# Patient Record
Sex: MALE | Race: WHITE | URBAN - METROPOLITAN AREA
[De-identification: names, ages, dates, MRNs, and addresses within clinical notes are randomized per-mention and may not be internally consistent; named-entity substitution may affect disease eponyms.]

---

## 2017-08-04 ENCOUNTER — OFFICE VISIT (OUTPATIENT)
Dept: FAMILY MEDICINE CLINIC | Facility: CLINIC | Age: 47
End: 2017-08-04

## 2017-08-04 ENCOUNTER — HOSPITAL ENCOUNTER (OUTPATIENT)
Dept: LAB | Age: 47
Discharge: HOME | End: 2017-08-04
Payer: COMMERCIAL

## 2017-08-04 VITALS
SYSTOLIC BLOOD PRESSURE: 122 MMHG | RESPIRATION RATE: 15 BRPM | TEMPERATURE: 97.8 F | OXYGEN SATURATION: 98 % | HEART RATE: 48 BPM | WEIGHT: 186.8 LBS | DIASTOLIC BLOOD PRESSURE: 76 MMHG | HEIGHT: 71 IN | BODY MASS INDEX: 26.15 KG/M2

## 2017-08-04 DIAGNOSIS — R79.89 ELEVATED TSH: ICD-10-CM

## 2017-08-04 DIAGNOSIS — Z13.31 NEGATIVE DEPRESSION SCREENING: ICD-10-CM

## 2017-08-04 DIAGNOSIS — Z00.00 ROUTINE GENERAL MEDICAL EXAMINATION AT A HEALTH CARE FACILITY: Primary | ICD-10-CM

## 2017-08-04 DIAGNOSIS — Z00.00 ROUTINE GENERAL MEDICAL EXAMINATION AT A HEALTH CARE FACILITY: ICD-10-CM

## 2017-08-04 DIAGNOSIS — J30.1 SEASONAL ALLERGIC RHINITIS DUE TO POLLEN: ICD-10-CM

## 2017-08-04 LAB
ALBUMIN SERPL BCP-MCNC: 4.6 G/DL (ref 3.4–5)
ALBUMIN/GLOB SERPL: 1.6 {RATIO} (ref 0.8–1.7)
ALP SERPL-CCNC: 55 U/L (ref 45–117)
ALT SERPL-CCNC: 27 U/L (ref 16–61)
ANION GAP BLD CALC-SCNC: 10 MMOL/L (ref 3–18)
APPEARANCE UR: CLEAR
AST SERPL W P-5'-P-CCNC: 30 U/L (ref 15–37)
BASOPHILS # BLD AUTO: 0 K/UL (ref 0–0.06)
BASOPHILS # BLD: 1 % (ref 0–2)
BILIRUB SERPL-MCNC: 1.2 MG/DL (ref 0.2–1)
BILIRUB UR QL: NEGATIVE
BUN SERPL-MCNC: 9 MG/DL (ref 7–18)
BUN/CREAT SERPL: 11 (ref 12–20)
CALCIUM SERPL-MCNC: 9.1 MG/DL (ref 8.5–10.1)
CHLORIDE SERPL-SCNC: 103 MMOL/L (ref 100–108)
CHOLEST SERPL-MCNC: 210 MG/DL
CO2 SERPL-SCNC: 27 MMOL/L (ref 21–32)
COLOR UR: YELLOW
CREAT SERPL-MCNC: 0.79 MG/DL (ref 0.6–1.3)
DIFFERENTIAL METHOD BLD: ABNORMAL
EOSINOPHIL # BLD: 0.1 K/UL (ref 0–0.4)
EOSINOPHIL NFR BLD: 2 % (ref 0–5)
ERYTHROCYTE [DISTWIDTH] IN BLOOD BY AUTOMATED COUNT: 13.4 % (ref 11.6–14.5)
GLOBULIN SER CALC-MCNC: 2.9 G/DL (ref 2–4)
GLUCOSE SERPL-MCNC: 75 MG/DL (ref 74–99)
GLUCOSE UR STRIP.AUTO-MCNC: NEGATIVE MG/DL
HCT VFR BLD AUTO: 43.2 % (ref 36–48)
HDLC SERPL-MCNC: 89 MG/DL (ref 40–60)
HDLC SERPL: 2.4 {RATIO} (ref 0–5)
HGB BLD-MCNC: 14.8 G/DL (ref 13–16)
HGB UR QL STRIP: NEGATIVE
KETONES UR QL STRIP.AUTO: 15 MG/DL
LDLC SERPL CALC-MCNC: 111.2 MG/DL (ref 0–100)
LEUKOCYTE ESTERASE UR QL STRIP.AUTO: NEGATIVE
LIPID PROFILE,FLP: ABNORMAL
LYMPHOCYTES # BLD AUTO: 40 % (ref 21–52)
LYMPHOCYTES # BLD: 1.7 K/UL (ref 0.9–3.6)
MCH RBC QN AUTO: 31.8 PG (ref 24–34)
MCHC RBC AUTO-ENTMCNC: 34.3 G/DL (ref 31–37)
MCV RBC AUTO: 92.9 FL (ref 74–97)
MONOCYTES # BLD: 0.3 K/UL (ref 0.05–1.2)
MONOCYTES NFR BLD AUTO: 6 % (ref 3–10)
NEUTS SEG # BLD: 2.3 K/UL (ref 1.8–8)
NEUTS SEG NFR BLD AUTO: 51 % (ref 40–73)
NITRITE UR QL STRIP.AUTO: NEGATIVE
PH UR STRIP: 5 [PH] (ref 5–8)
PLATELET # BLD AUTO: 204 K/UL (ref 135–420)
PMV BLD AUTO: 11.6 FL (ref 9.2–11.8)
POTASSIUM SERPL-SCNC: 4 MMOL/L (ref 3.5–5.5)
PROT SERPL-MCNC: 7.5 G/DL (ref 6.4–8.2)
PROT UR STRIP-MCNC: NEGATIVE MG/DL
RBC # BLD AUTO: 4.65 M/UL (ref 4.7–5.5)
SODIUM SERPL-SCNC: 140 MMOL/L (ref 136–145)
SP GR UR REFRACTOMETRY: 1.01 (ref 1–1.03)
T4 FREE SERPL-MCNC: 1 NG/DL (ref 0.7–1.5)
TRIGL SERPL-MCNC: 49 MG/DL (ref ?–150)
TSH SERPL DL<=0.05 MIU/L-ACNC: 18.4 UIU/ML (ref 0.36–3.74)
UROBILINOGEN UR QL STRIP.AUTO: 0.2 EU/DL (ref 0.2–1)
VLDLC SERPL CALC-MCNC: 9.8 MG/DL
WBC # BLD AUTO: 4.3 K/UL (ref 4.6–13.2)

## 2017-08-04 PROCEDURE — 84443 ASSAY THYROID STIM HORMONE: CPT | Performed by: FAMILY MEDICINE

## 2017-08-04 PROCEDURE — 81003 URINALYSIS AUTO W/O SCOPE: CPT | Performed by: FAMILY MEDICINE

## 2017-08-04 PROCEDURE — 80061 LIPID PANEL: CPT | Performed by: FAMILY MEDICINE

## 2017-08-04 PROCEDURE — 36415 COLL VENOUS BLD VENIPUNCTURE: CPT | Performed by: FAMILY MEDICINE

## 2017-08-04 PROCEDURE — 80053 COMPREHEN METABOLIC PANEL: CPT | Performed by: FAMILY MEDICINE

## 2017-08-04 PROCEDURE — 85025 COMPLETE CBC W/AUTO DIFF WBC: CPT | Performed by: FAMILY MEDICINE

## 2017-08-04 PROCEDURE — 84439 ASSAY OF FREE THYROXINE: CPT | Performed by: FAMILY MEDICINE

## 2017-08-04 NOTE — PROGRESS NOTES
Michelle Dugan is a 56 y.o.  male presents today for office visit for physical. Pt is in Room # 5.      1. Have you been to the ER, urgent care clinic since your last visit? Hospitalized since your last visit? No    2. Have you seen or consulted any other health care providers outside of the [address] [place] [address] since your last visit? Include any pap smears or colon screening. Yes Surgeon 10/2016, Dr. Colletta Brandt, Optometry 7/31/17  Upcoming Appts  N/A    Health Maintenance reviewed. Vision Screening Comments: Pt declined screening at this time.

## 2017-08-04 NOTE — PROGRESS NOTES
SUBJECTIVE:  Clint White is a 56y.o. year old male   Chief Complaint   Patient presents with    Physical       History of Present Illness:   Patient is here for preventive evaluation. He is following diet and exercise. He wants referral to allergist for allergy testing. He is following diet for lipids. His chronic tinnitus is stable. He is s/p lipoma resection and vasectomy. Normal Sensory, Motor, Social and  Language function. No systemic, respiratory or cardiovascular symptoms. Past Medical History:   Diagnosis Date    Elevated TSH     Hayfever     during spring    Hyperlipidemia     Multiple lipomas      Past Surgical History:   Procedure Laterality Date    HX PREMALIG/BENIGN SKIN LESION EXCISION Right 10/2016    LR of back    HX VASECTOMY  06/02/2017    [facility], [facility]     HX VASECTOMY N/A 2017        Current Outpatient Prescriptions   Medication Sig    multivitamin (ONE A DAY) tablet Take 1 Tab by mouth daily.  EPINEPHrine (EPIPEN) 0.3 mg/0.3 mL (1:1,000) injection 0.3 mL by IntraMUSCular route once as needed for up to 1 dose. No current facility-administered medications for this visit. Allergies   Allergen Reactions    Banana Nausea and Vomiting    Egg Nausea and Vomiting    Milk Nausea Only    Peanut Other (comments)        Family History   Problem Relation Age of Onset    Heart Disease Mother     Heart Disease Father     Hypertension Father     Hypertension Brother         Social History   Substance Use Topics    Smoking status: Former Smoker     Packs/day: 0.50     Years: 8.00     Quit date: 1/14/2009    Smokeless tobacco: Never Used    Alcohol use 1.8 oz/week     3 Standard drinks or equivalent per week       Review of Systems:   Constitutional: No fever, chills, night sweats, malaise, dizziness. Eyes: Glasses. No eye discomfort, discharge, redness, new visual changes.    Ear/Nose/Throat: No ear/ throat/ sinus pain, lesions, unusual discharge, new speaking or hearing problems, epistaxis. Cardiovascular: No angina, palpitations, PND, orthopnea, lightheadedness, edema, claudication. Respiratory: No dyspnea, wheeze, pleurisy, hemoptysis, unusual cough or sputum. Gastrointestinal: No nausea/ vomiting, bowel habit change, pain, YOLANDA symptoms, melena, hematochezia, anorexia. Genitourinary: Denies any comlaints  Musculoskeletal: No joint swelling/pain, instability, focal weakness, stiffness/rigidity, radicular pain. Skin/Breast: Denies any complaints. Neurological: No seizures, numbness, dizziness, speech abnormality, incontinence. Psychiatric: No agitation, confusion/disorientation, suicidal or homicidal ideation. Endocrine: Denies any complaints. Heme/Onc/Lympha: Denies any complaints. Allergy/Immunol: Denies any complaints. OBJECTIVE:  Physical Exam:   Constitutional: General Appearance:  well developed, well nourished, nontoxic, in no acute distress. Visit Vitals    /76 (BP 1 Location: Left arm, BP Patient Position: Sitting)    Pulse (!) 48    Temp 97.8 °F (36.6 °C) (Oral)    Resp 15    Ht 5' 11\" (1.803 m)    Wt 186 lb 12.8 oz (84.7 kg)    SpO2 98%    BMI 26.05 kg/m2     Eyes: Conjunctiva: normal & Lids: normal.  Pupils & Irises: normal size; equal, round and reactive to light. ENT/Mouth: Otoscopic Examination: ear canals are clear; tympanic membranes are clear. Nose: clear. Throat:  Clear. Neck Area: Neck: without masses, symmetric, trachea is midline. Thyroid:  without significant enlargement, masses or tenderness. Pulmonary: Respiratory effort: normal; no dyspnea, no retractions, no accessory muscle use. Auscultation: normal & symmetrical air exchange; no rales, no rhonchi, no wheeze; no rubs. Cardiovascular: Palpation: PMI not displaced or enlarged, no thrills or heaves. Auscultation: RRR; no murmur, rubs or gallops. Extremities: no edema, no active varicosity.  Neck: carotid arteries- normal volume & without bruit; no JVD. Femoral arteries: normal volume, no bruit. Pedal pulses: normal volume. Gastrointestinal: Normal bowel sounds. No masses; no tenderness; no rebound/rigidity; no CVA tenderness. No hepatosplenomegaly. No inguinal, ventral or umbilical hernias. Genitourinary: Scrotal: s/p vasectomy, no cord tenderness, testicular masses, hydrocele or spermatocele. Penis: normal male, no masses, normal urethra, no discharge. Skin: Inspection: no significant rashes, lesions or ulcers. Nodes: Cervical: no significant adenopathy. Inguinal: no significant adenopathy. Psychiatric: Oriented to time, place and person. Normal mood, no agitation or anxiety. Normal affect. Pleasant and cooperative. Neurologic: CN I to XII: intact. DTR: symmetrical & normal.    Musculoskeletal: NC/AT. Neck-supple. Gait and Station: gait- normal; station- normal.  All Extremities: no heat, effusion, redness or tenderness; normal strength/tone; no instability; FROM. Spine/ back/ posture: normal.    ASSESSMENT:     1. Routine general medical examination at a health care facility    2. Negative depression screening    3. Elevated TSH    4. Seasonal allergic rhinitis due to pollen        PLAN:     Orders Placed This Encounter    CBC WITH AUTOMATED DIFF    METABOLIC PANEL, COMPREHENSIVE    LIPID PANEL    TSH 3RD GENERATION    T4, FREE    URINALYSIS W/ RFLX MICROSCOPIC    REFERRAL TO ALLERGY    [place] PATIENT SCREENED FOR DEPRESSION        Pharmacologic Management: Medications reviewed with the patient. No change from here. Other Instructions: Discussed DDx, follow-up & work-up. Discussed risk/benefit & side effect of treatment. Follow up visit as planned, prn sooner. Discussed nutrition, education, accident prevention and anticipatory guidance. Health risk from non adherence discussed. Patient voiced understanding.        Estela Sosa MD

## 2017-08-06 RX ORDER — LEVOTHYROXINE SODIUM 50 UG/1
50 TABLET ORAL
Qty: 90 TAB | Refills: 1 | Status: SHIPPED | OUTPATIENT
Start: 2017-08-06 | End: 2017-08-07 | Stop reason: SDUPTHER

## 2017-08-07 ENCOUNTER — TELEPHONE (OUTPATIENT)
Dept: FAMILY MEDICINE CLINIC | Facility: CLINIC | Age: 47
End: 2017-08-07

## 2017-08-07 DIAGNOSIS — R79.89 ELEVATED TSH: Primary | ICD-10-CM

## 2017-08-07 RX ORDER — LEVOTHYROXINE SODIUM 50 UG/1
50 TABLET ORAL
Qty: 90 TAB | Refills: 1 | Status: SHIPPED | OUTPATIENT
Start: 2017-08-07 | End: 2018-01-10 | Stop reason: SDUPTHER

## 2017-08-07 NOTE — PROGRESS NOTES
TSH is now over 10. At this level will need to start on Synthroid. Sent levothyroxine 50 mcg to [facility] to be taken 50 mcg daily. Need to recheck in 3 months. Will also check bilirubin then.

## 2017-08-15 ENCOUNTER — HOSPITAL ENCOUNTER (OUTPATIENT)
Dept: LAB | Age: 47
Discharge: HOME | End: 2017-08-15
Payer: COMMERCIAL

## 2017-08-15 DIAGNOSIS — R79.89 ELEVATED TSH: ICD-10-CM

## 2017-08-15 LAB — TSH SERPL DL<=0.05 MIU/L-ACNC: 25.8 UIU/ML (ref 0.36–3.74)

## 2017-08-15 PROCEDURE — 36415 COLL VENOUS BLD VENIPUNCTURE: CPT | Performed by: FAMILY MEDICINE

## 2017-08-15 PROCEDURE — 84443 ASSAY THYROID STIM HORMONE: CPT | Performed by: FAMILY MEDICINE

## 2017-08-15 NOTE — PROGRESS NOTES
TSH is even higher. Please start taking Synthroid as already advised. Will check TSH, T4 in 3 months.

## 2017-08-21 ENCOUNTER — TELEPHONE (OUTPATIENT)
Dept: FAMILY MEDICINE CLINIC | Facility: CLINIC | Age: 47
End: 2017-08-21

## 2017-08-21 NOTE — TELEPHONE ENCOUNTER
Spoke with pt in regards to lab results. Two pt identifier's and permission to release verified. Relayed 's notes. Pt acknowledges understanding and  States he is agreeable to starting the medication.

## 2017-11-14 NOTE — TELEPHONE ENCOUNTER
Received a call from the pt re: Thyroid testing. Called pt and left message. Call back number left and I myself or one of the other nurses will attempt to contact again. The call was to inform pt will need a follow up appt to recheck labs.

## 2017-11-27 ENCOUNTER — HOSPITAL ENCOUNTER (OUTPATIENT)
Dept: LAB | Age: 47
Discharge: HOME | End: 2017-11-27
Payer: COMMERCIAL

## 2017-11-27 DIAGNOSIS — R17 TOTAL BILIRUBIN, ELEVATED: ICD-10-CM

## 2017-11-27 DIAGNOSIS — E03.9 ACQUIRED HYPOTHYROIDISM: ICD-10-CM

## 2017-11-27 PROCEDURE — 84439 ASSAY OF FREE THYROXINE: CPT | Performed by: FAMILY MEDICINE

## 2017-11-27 PROCEDURE — 82247 BILIRUBIN TOTAL: CPT | Performed by: FAMILY MEDICINE

## 2017-11-27 PROCEDURE — 36415 COLL VENOUS BLD VENIPUNCTURE: CPT | Performed by: FAMILY MEDICINE

## 2017-11-27 PROCEDURE — 82248 BILIRUBIN DIRECT: CPT | Performed by: FAMILY MEDICINE

## 2017-11-27 PROCEDURE — 84443 ASSAY THYROID STIM HORMONE: CPT | Performed by: FAMILY MEDICINE

## 2017-11-28 DIAGNOSIS — E03.9 ACQUIRED HYPOTHYROIDISM: Primary | ICD-10-CM

## 2017-11-28 DIAGNOSIS — R17 TOTAL BILIRUBIN, ELEVATED: ICD-10-CM

## 2017-11-28 LAB
BILIRUB DIRECT SERPL-MCNC: 0.2 MG/DL (ref 0–0.2)
BILIRUB SERPL-MCNC: 1 MG/DL (ref 0.2–1)
T4 FREE SERPL-MCNC: 1.3 NG/DL (ref 0.7–1.5)
TSH SERPL DL<=0.05 MIU/L-ACNC: 5.87 UIU/ML (ref 0.36–3.74)

## 2017-11-29 NOTE — PROGRESS NOTES
TSH was much better; only a little high; may improve further with continuing the same dose. Will discuss at [address] Other BW was good.

## 2017-11-30 ENCOUNTER — TELEPHONE (OUTPATIENT)
Dept: FAMILY MEDICINE CLINIC | Facility: CLINIC | Age: 47
End: 2017-11-30

## 2017-12-20 ENCOUNTER — OFFICE VISIT (OUTPATIENT)
Dept: FAMILY MEDICINE CLINIC | Facility: CLINIC | Age: 47
End: 2017-12-20

## 2017-12-20 VITALS
HEART RATE: 52 BPM | BODY MASS INDEX: 25.76 KG/M2 | RESPIRATION RATE: 15 BRPM | OXYGEN SATURATION: 99 % | WEIGHT: 184 LBS | HEIGHT: 71 IN | TEMPERATURE: 98.4 F | DIASTOLIC BLOOD PRESSURE: 80 MMHG | SYSTOLIC BLOOD PRESSURE: 110 MMHG

## 2017-12-20 DIAGNOSIS — E66.3 OVERWEIGHT: ICD-10-CM

## 2017-12-20 DIAGNOSIS — E03.9 ACQUIRED HYPOTHYROIDISM: Primary | ICD-10-CM

## 2017-12-20 NOTE — PROGRESS NOTES
Jerry Ortega is a 56 y.o.  male presents today for office visit for routine follow up. Pt is in Room # 4.      1. Have you been to the ER, urgent care clinic since your last visit? Hospitalized since your last visit? No    2. Have you seen or consulted any other health care providers outside of the [address] [place] [address] since your last visit? Include any pap smears or colon screening. No    Health Maintenance reviewed.       Upcoming Appts  N/A

## 2017-12-20 NOTE — PROGRESS NOTES
SUBJECTIVE:  Cleo Arce is a 56y.o. year old male   Chief Complaint   Patient presents with    Hypothyroidism    Results       History of Present Illness:   He is on Synthroid for hypothyroidism. He is following diet for lipids and weight. No systemic, respiratory or cardiovascular symptoms. Past Medical History:   Diagnosis Date    Elevated TSH     Hayfever     during spring    Hyperlipidemia     Multiple lipomas      Past Surgical History:   Procedure Laterality Date    HX PREMALIG/BENIGN SKIN LESION EXCISION Right 10/2016    LR of back    HX VASECTOMY  06/02/2017    [facility], [facility]     HX VASECTOMY N/A 2017        Current Outpatient Prescriptions   Medication Sig    levothyroxine (SYNTHROID) 50 mcg tablet Take 1 Tab by mouth Daily (before breakfast).  multivitamin (ONE A DAY) tablet Take 1 Tab by mouth daily.  EPINEPHrine (EPIPEN) 0.3 mg/0.3 mL (1:1,000) injection 0.3 mL by IntraMUSCular route once as needed for up to 1 dose. No current facility-administered medications for this visit. Allergies   Allergen Reactions    Banana Nausea and Vomiting    Egg Nausea and Vomiting    Milk Nausea Only    Peanut Other (comments)        Family History   Problem Relation Age of Onset    Heart Disease Mother     Heart Disease Father     Hypertension Father     Hypertension Brother         Social History   Substance Use Topics    Smoking status: Former Smoker     Packs/day: 0.50     Years: 8.00     Quit date: 1/14/2009    Smokeless tobacco: Never Used    Alcohol use 1.8 oz/week     3 Standard drinks or equivalent per week       Review of Systems:   Constitutional: No fever, chills, night sweats, malaise, dizziness. Cardiovascular: No angina, palpitations, PND, orthopnea, lightheadedness, edema, claudication. Respiratory: No dyspnea, wheeze, pleurisy, hemoptysis, unusual cough or sputum.    Gastrointestinal: No nausea/ vomiting, bowel habit change, pain, YOLANDA symptoms, melena, hematochezia, anorexia. Musculoskeletal: No joint swelling/pain, instability, focal weakness, stiffness/rigidity, radicular pain. Skin/Breast: Denies any complaints. Neurological: No seizures, numbness, dizziness, speech abnormality, incontinence. Psychiatric: No agitation, confusion/disorientation, suicidal or homicidal ideation. Endocrine: Denies any other complaints. OBJECTIVE:  Physical Exam:   Constitutional: General Appearance:  well developed, well nourished, nontoxic, in no acute distress. Visit Vitals    /80 (BP 1 Location: Left arm, BP Patient Position: Sitting)    Pulse (!) 52    Temp 98.4 °F (36.9 °C) (Oral)    Resp 15    Ht 5' 11\" (1.803 m)    Wt 184 lb (83.5 kg)    SpO2 99%    BMI 25.66 kg/m2     Pulmonary: Respiratory effort: normal; no dyspnea, no retractions, no accessory muscle use. Auscultation: normal & symmetrical air exchange; no rales, no rhonchi, no wheeze; no rubs. Cardiovascular: Palpation: PMI not displaced or enlarged, no thrills or heaves. Auscultation: RRR; no murmur, rubs or gallops. Extremities: no edema, no active varicosity. Neck: carotid arteries- normal volume & without bruit; no JVD. Femoral arteries: normal volume, no bruit. Pedal pulses: normal volume. Gastrointestinal: Normal bowel sounds. No masses; no tenderness; no rebound/rigidity; no CVA tenderness. No hepatosplenomegaly. No inguinal, ventral or umbilical hernias. Skin: Inspection: no significant rashes, lesions or ulcers. Psychiatric: Oriented to time, place and person. Normal mood, no agitation or anxiety. Normal affect. Pleasant and cooperative. Neurologic: CN I to XII: intact. DTR: symmetrical & normal.    Musculoskeletal: NC/AT. Neck-supple. Lab work from 11/27/17 and 9/1/17 reviewed with the patient. ASSESSMENT:     1. Acquired hypothyroidism    2.  Overweight        PLAN:     Orders Placed This Encounter    TSH 3RD GENERATION    T4, FREE   @ 2/7/18     Pharmacologic Management: Medications reviewed with the patient. No change from here. Other Instructions: Discussed DDx, follow-up & work-up. Discussed risk/benefit & side effect of treatment. Follow up visit as planned, prn sooner. Low salt ADA diet, weightloss & graduated excecise. Health risk from non adherence discussed. Patient voiced understanding. Follow-up Disposition:  Return in about 6 months (around 6/20/2018). Declines sooner appointment.     Juan José Luciano MD

## 2018-01-10 RX ORDER — LEVOTHYROXINE SODIUM 50 UG/1
50 TABLET ORAL
Qty: 90 TAB | Refills: 1 | Status: SHIPPED | OUTPATIENT
Start: 2018-01-10 | End: 2018-06-01 | Stop reason: SDUPTHER

## 2018-05-17 ENCOUNTER — TELEPHONE (OUTPATIENT)
Dept: FAMILY MEDICINE CLINIC | Age: 48
End: 2018-05-17

## 2018-05-17 DIAGNOSIS — E03.9 ACQUIRED HYPOTHYROIDISM: Primary | ICD-10-CM

## 2018-05-17 NOTE — TELEPHONE ENCOUNTER
Pt called requesting blood work put in to check his htyroid. Pt said Dr OLIVEROS has always done that for him. Please call pt if it is ok'd by Dr OLIVEROS please.  Pt aware of 72 hour wait

## 2018-05-29 ENCOUNTER — HOSPITAL ENCOUNTER (OUTPATIENT)
Dept: LAB | Age: 48
Discharge: HOME | End: 2018-05-29
Payer: COMMERCIAL

## 2018-05-29 DIAGNOSIS — E03.9 ACQUIRED HYPOTHYROIDISM: ICD-10-CM

## 2018-05-29 LAB
T4 FREE SERPL-MCNC: 0.9 NG/DL (ref 0.7–1.5)
TSH SERPL DL<=0.05 MIU/L-ACNC: 13.4 UIU/ML (ref 0.36–3.74)

## 2018-05-29 PROCEDURE — 84439 ASSAY OF FREE THYROXINE: CPT | Performed by: FAMILY MEDICINE

## 2018-05-29 PROCEDURE — 36415 COLL VENOUS BLD VENIPUNCTURE: CPT | Performed by: FAMILY MEDICINE

## 2018-05-29 PROCEDURE — 84443 ASSAY THYROID STIM HORMONE: CPT | Performed by: FAMILY MEDICINE

## 2018-06-01 ENCOUNTER — TELEPHONE (OUTPATIENT)
Dept: FAMILY MEDICINE CLINIC | Age: 48
End: 2018-06-01

## 2018-06-01 RX ORDER — LEVOTHYROXINE SODIUM 75 UG/1
75 TABLET ORAL
Qty: 90 TAB | Refills: 0 | Status: SHIPPED | OUTPATIENT
Start: 2018-06-01 | End: 2018-08-08 | Stop reason: SDUPTHER

## 2018-06-01 NOTE — PROGRESS NOTES
TSH was higher. We will increase your Synthroid to 75 mcg daily from 50 mcg daily. Please make an appointment and follow-up this month. The medication change has been sent to [facility]. Until the mail order comes, may take 1-1/2 of the 50 mcg daily.

## 2018-06-01 NOTE — TELEPHONE ENCOUNTER
Spoke to pt and made aware of the message. Advised pt to follow up with PCP, verbalized understanding. Thank you  Kimberley Bauman LPN

## 2018-06-01 NOTE — PROGRESS NOTES
Spoke to pt and made aware of the message, verbalized understanding.  Thank you  Deondre Rodriguez LPN

## 2018-06-01 NOTE — TELEPHONE ENCOUNTER
----- Message from Sarwat Curry MD sent at 6/1/2018  8:54 AM EDT -----  TSH was higher. We will increase your Synthroid to 75 mcg daily from 50 mcg daily. Please make an appointment and follow-up this month. The medication change has been sent to [facility]. Until the mail order comes, may take 1-1/2 of the 50 mcg daily.

## 2018-07-30 ENCOUNTER — TELEPHONE (OUTPATIENT)
Dept: FAMILY MEDICINE CLINIC | Age: 48
End: 2018-07-30

## 2018-07-30 DIAGNOSIS — E66.3 OVERWEIGHT: ICD-10-CM

## 2018-07-30 DIAGNOSIS — E03.9 ACQUIRED HYPOTHYROIDISM: Primary | ICD-10-CM

## 2018-07-30 NOTE — TELEPHONE ENCOUNTER
PT called today and scheduled an appointment for 8/6/18 to have health assessment forms for work completed and pt would like to know if he can come in prior to the appointment to have lab work done. Please advise and call pt.

## 2018-07-31 NOTE — TELEPHONE ENCOUNTER
Called and advised patient that the labs requested have been ordered in the medical record. Patient is aware. Closing encounter.

## 2018-08-02 ENCOUNTER — HOSPITAL ENCOUNTER (OUTPATIENT)
Dept: LAB | Age: 48
Discharge: HOME | End: 2018-08-02
Payer: COMMERCIAL

## 2018-08-02 DIAGNOSIS — E03.9 ACQUIRED HYPOTHYROIDISM: ICD-10-CM

## 2018-08-02 DIAGNOSIS — E66.3 OVERWEIGHT: ICD-10-CM

## 2018-08-02 LAB
ALBUMIN SERPL-MCNC: 3.9 G/DL (ref 3.4–5)
ALBUMIN/GLOB SERPL: 1.3 {RATIO} (ref 0.8–1.7)
ALP SERPL-CCNC: 50 U/L (ref 45–117)
ALT SERPL-CCNC: 25 U/L (ref 16–61)
ANION GAP SERPL CALC-SCNC: 5 MMOL/L (ref 3–18)
APPEARANCE UR: CLEAR
AST SERPL-CCNC: 23 U/L (ref 15–37)
BASOPHILS # BLD: 0 K/UL (ref 0–0.1)
BASOPHILS NFR BLD: 1 % (ref 0–2)
BILIRUB SERPL-MCNC: 0.6 MG/DL (ref 0.2–1)
BILIRUB UR QL: NEGATIVE
BUN SERPL-MCNC: 9 MG/DL (ref 7–18)
BUN/CREAT SERPL: 13 (ref 12–20)
CALCIUM SERPL-MCNC: 8.3 MG/DL (ref 8.5–10.1)
CHLORIDE SERPL-SCNC: 106 MMOL/L (ref 100–108)
CHOLEST SERPL-MCNC: 211 MG/DL
CO2 SERPL-SCNC: 28 MMOL/L (ref 21–32)
COLOR UR: YELLOW
CREAT SERPL-MCNC: 0.71 MG/DL (ref 0.6–1.3)
DIFFERENTIAL METHOD BLD: ABNORMAL
EOSINOPHIL # BLD: 0.2 K/UL (ref 0–0.4)
EOSINOPHIL NFR BLD: 4 % (ref 0–5)
ERYTHROCYTE [DISTWIDTH] IN BLOOD BY AUTOMATED COUNT: 13 % (ref 11.6–14.5)
GLOBULIN SER CALC-MCNC: 2.9 G/DL (ref 2–4)
GLUCOSE SERPL-MCNC: 85 MG/DL (ref 74–99)
GLUCOSE UR STRIP.AUTO-MCNC: NEGATIVE MG/DL
HCT VFR BLD AUTO: 41.2 % (ref 36–48)
HDLC SERPL-MCNC: 63 MG/DL (ref 40–60)
HDLC SERPL: 3.3 {RATIO} (ref 0–5)
HGB BLD-MCNC: 14.4 G/DL (ref 13–16)
HGB UR QL STRIP: NEGATIVE
KETONES UR QL STRIP.AUTO: NEGATIVE MG/DL
LDLC SERPL CALC-MCNC: 119.2 MG/DL (ref 0–100)
LEUKOCYTE ESTERASE UR QL STRIP.AUTO: NEGATIVE
LIPID PROFILE,FLP: ABNORMAL
LYMPHOCYTES # BLD: 1.4 K/UL (ref 0.9–3.6)
LYMPHOCYTES NFR BLD: 40 % (ref 21–52)
MCH RBC QN AUTO: 31.8 PG (ref 24–34)
MCHC RBC AUTO-ENTMCNC: 35 G/DL (ref 31–37)
MCV RBC AUTO: 90.9 FL (ref 74–97)
MONOCYTES # BLD: 0.2 K/UL (ref 0.05–1.2)
MONOCYTES NFR BLD: 5 % (ref 3–10)
NEUTS SEG # BLD: 1.8 K/UL (ref 1.8–8)
NEUTS SEG NFR BLD: 50 % (ref 40–73)
NITRITE UR QL STRIP.AUTO: NEGATIVE
PH UR STRIP: 7 [PH] (ref 5–8)
PLATELET # BLD AUTO: 171 K/UL (ref 135–420)
PMV BLD AUTO: 11.8 FL (ref 9.2–11.8)
POTASSIUM SERPL-SCNC: 4.2 MMOL/L (ref 3.5–5.5)
PROT SERPL-MCNC: 6.8 G/DL (ref 6.4–8.2)
PROT UR STRIP-MCNC: NEGATIVE MG/DL
RBC # BLD AUTO: 4.53 M/UL (ref 4.7–5.5)
SODIUM SERPL-SCNC: 139 MMOL/L (ref 136–145)
SP GR UR REFRACTOMETRY: 1.01 (ref 1–1.03)
T4 FREE SERPL-MCNC: 1 NG/DL (ref 0.7–1.5)
TRIGL SERPL-MCNC: 144 MG/DL (ref ?–150)
TSH SERPL DL<=0.05 MIU/L-ACNC: 7.9 UIU/ML (ref 0.36–3.74)
UROBILINOGEN UR QL STRIP.AUTO: 0.2 EU/DL (ref 0.2–1)
VLDLC SERPL CALC-MCNC: 28.8 MG/DL
WBC # BLD AUTO: 3.6 K/UL (ref 4.6–13.2)

## 2018-08-02 PROCEDURE — 80053 COMPREHEN METABOLIC PANEL: CPT | Performed by: FAMILY MEDICINE

## 2018-08-02 PROCEDURE — 84443 ASSAY THYROID STIM HORMONE: CPT | Performed by: FAMILY MEDICINE

## 2018-08-02 PROCEDURE — 36415 COLL VENOUS BLD VENIPUNCTURE: CPT | Performed by: FAMILY MEDICINE

## 2018-08-02 PROCEDURE — 85025 COMPLETE CBC W/AUTO DIFF WBC: CPT | Performed by: FAMILY MEDICINE

## 2018-08-02 PROCEDURE — 84439 ASSAY OF FREE THYROXINE: CPT | Performed by: FAMILY MEDICINE

## 2018-08-02 PROCEDURE — 80061 LIPID PANEL: CPT | Performed by: FAMILY MEDICINE

## 2018-08-02 PROCEDURE — 81003 URINALYSIS AUTO W/O SCOPE: CPT | Performed by: FAMILY MEDICINE

## 2018-08-06 ENCOUNTER — HOSPITAL ENCOUNTER (OUTPATIENT)
Dept: LAB | Age: 48
Discharge: HOME | End: 2018-08-06
Payer: COMMERCIAL

## 2018-08-06 ENCOUNTER — OFFICE VISIT (OUTPATIENT)
Dept: FAMILY MEDICINE CLINIC | Age: 48
End: 2018-08-06

## 2018-08-06 VITALS
HEIGHT: 71 IN | HEART RATE: 44 BPM | BODY MASS INDEX: 26.04 KG/M2 | WEIGHT: 186 LBS | SYSTOLIC BLOOD PRESSURE: 120 MMHG | RESPIRATION RATE: 14 BRPM | OXYGEN SATURATION: 96 % | DIASTOLIC BLOOD PRESSURE: 74 MMHG | TEMPERATURE: 97.6 F

## 2018-08-06 DIAGNOSIS — R79.89 LOW SERUM CALCIUM: ICD-10-CM

## 2018-08-06 DIAGNOSIS — Z91.030 BEE STING ALLERGY: ICD-10-CM

## 2018-08-06 DIAGNOSIS — E03.9 ACQUIRED HYPOTHYROIDISM: Primary | ICD-10-CM

## 2018-08-06 LAB
25(OH)D3 SERPL-MCNC: 17.3 NG/ML (ref 30–100)
PHOSPHATE SERPL-MCNC: 2.9 MG/DL (ref 2.5–4.9)

## 2018-08-06 PROCEDURE — 82330 ASSAY OF CALCIUM: CPT | Performed by: FAMILY MEDICINE

## 2018-08-06 PROCEDURE — 82306 VITAMIN D 25 HYDROXY: CPT | Performed by: FAMILY MEDICINE

## 2018-08-06 PROCEDURE — 84100 ASSAY OF PHOSPHORUS: CPT | Performed by: FAMILY MEDICINE

## 2018-08-06 PROCEDURE — 36415 COLL VENOUS BLD VENIPUNCTURE: CPT | Performed by: FAMILY MEDICINE

## 2018-08-06 RX ORDER — EPINEPHRINE 0.3 MG/.3ML
0.3 INJECTION SUBCUTANEOUS
Qty: 0.3 ML | Refills: 1 | Status: SHIPPED | OUTPATIENT
Start: 2018-08-06 | End: 2018-08-06

## 2018-08-06 NOTE — PROGRESS NOTES
SUBJECTIVE:  Viri Hein is a 53y.o. year old male   Chief Complaint   Patient presents with    Thyroid Problem       History of Present Illness:   He is on Synthroid for hypothyroidism. His levothyroxine was increased 2 months ago. His Thyroid profile is improving. He is doing cardiovascular exercises and denies any hemodynamic symptoms. He is following diet for lipids and weight. He needs Epipen refill for his bee sting allergy. No systemic, respiratory or cardiovascular symptoms. Past Medical History:   Diagnosis Date    Elevated TSH     Hayfever     during spring    Hyperlipidemia     Multiple lipomas      Past Surgical History:   Procedure Laterality Date    HX PREMALIG/BENIGN SKIN LESION EXCISION Right 10/2016    LR of back    HX VASECTOMY  06/02/2017    [facility], [facility]     HX VASECTOMY N/A 2017        Current Outpatient Prescriptions   Medication Sig    levothyroxine (SYNTHROID) 75 mcg tablet Take 1 Tab by mouth Daily (before breakfast).  multivitamin (ONE A DAY) tablet Take 1 Tab by mouth daily.  EPINEPHrine (EPIPEN) 0.3 mg/0.3 mL (1:1,000) injection 0.3 mL by IntraMUSCular route once as needed for up to 1 dose. No current facility-administered medications for this visit. Allergies   Allergen Reactions    Banana Nausea and Vomiting    Egg Nausea and Vomiting    Milk Nausea Only    Peanut Other (comments)        Family History   Problem Relation Age of Onset    Heart Disease Mother     Heart Disease Father     Hypertension Father     Hypertension Brother         Social History   Substance Use Topics    Smoking status: Former Smoker     Packs/day: 0.50     Years: 8.00     Quit date: 1/14/2009    Smokeless tobacco: Never Used    Alcohol use 1.8 oz/week     3 Standard drinks or equivalent per week       Review of Systems:   Constitutional: No fever, chills, night sweats, malaise, dizziness.    Cardiovascular: No angina, palpitations, PND, orthopnea, lightheadedness, edema, claudication. Respiratory: No dyspnea, wheeze, pleurisy, hemoptysis, unusual cough or sputum. Gastrointestinal: No nausea/ vomiting, bowel habit change, pain, YOLANDA symptoms, melena, hematochezia, anorexia. Musculoskeletal: No joint swelling/pain, instability, focal weakness, stiffness/rigidity, radicular pain. Skin/Breast: Denies any complaints. Neurological: No seizures, numbness, dizziness, speech abnormality, incontinence. Psychiatric: No agitation, confusion/disorientation, suicidal or homicidal ideation. Endocrine: Denies any other complaints. OBJECTIVE:  Physical Exam:   Constitutional: General Appearance:  well developed, well nourished, nontoxic, in no acute distress. Visit Vitals    /74 (BP 1 Location: Left arm, BP Patient Position: Sitting)    Pulse (!) 44    Temp 97.6 °F (36.4 °C) (Oral)    Resp 14    Ht 5' 11\" (1.803 m)    Wt 186 lb (84.4 kg)    SpO2 96%    BMI 25.94 kg/m2     Pulmonary: Respiratory effort: normal; no dyspnea, no retractions, no accessory muscle use. Auscultation: normal & symmetrical air exchange; no rales, no rhonchi, no wheeze; no rubs. Cardiovascular: Palpation: PMI not displaced or enlarged, no thrills or heaves. Auscultation: RRR @ 56 BPM; no murmur, rubs or gallops. Extremities: no edema, no active varicosity. Neck: carotid arteries- normal volume & without bruit; no JVD. Femoral arteries: normal volume, no bruit. Pedal pulses: normal volume. Gastrointestinal: Normal bowel sounds. No masses; no tenderness; no rebound/rigidity; no CVA tenderness. No hepatosplenomegaly. No inguinal, ventral or umbilical hernias. Skin: Inspection: no significant rashes, lesions or ulcers. Psychiatric: Oriented to time, place and person. Normal mood, no agitation or anxiety. Normal affect. Pleasant and cooperative. Neurologic: CN I to XII: intact. DTR: symmetrical & normal.    Musculoskeletal: NC/AT. Neck-supple. Lab Results   Component Value Date/Time    WBC 3.6 (L) 08/02/2018 08:23 AM    HGB 14.4 08/02/2018 08:23 AM    HCT 41.2 08/02/2018 08:23 AM    PLATELET 314 00/52/6571 08:23 AM    MCV 90.9 08/02/2018 08:23 AM     Lab Results   Component Value Date/Time    Sodium 139 08/02/2018 08:23 AM    Potassium 4.2 08/02/2018 08:23 AM    Chloride 106 08/02/2018 08:23 AM    CO2 28 08/02/2018 08:23 AM    Anion gap 5 08/02/2018 08:23 AM    Glucose 85 08/02/2018 08:23 AM    BUN 9 08/02/2018 08:23 AM    Creatinine 0.71 08/02/2018 08:23 AM    BUN/Creatinine ratio 13 08/02/2018 08:23 AM    GFR est AA >60 08/02/2018 08:23 AM    GFR est non-AA >60 08/02/2018 08:23 AM    Calcium 8.3 (L) 08/02/2018 08:23 AM    Bilirubin, total 0.6 08/02/2018 08:23 AM    AST (SGOT) 23 08/02/2018 08:23 AM    Alk. phosphatase 50 08/02/2018 08:23 AM    Protein, total 6.8 08/02/2018 08:23 AM    Albumin 3.9 08/02/2018 08:23 AM    Globulin 2.9 08/02/2018 08:23 AM    A-G Ratio 1.3 08/02/2018 08:23 AM    ALT (SGPT) 25 08/02/2018 08:23 AM     Lab Results   Component Value Date/Time    TSH 7.90 (H) 08/02/2018 08:23 AM    Triiodothyronine (T3), free 2.9 01/21/2014 12:02 PM    T4, Free 1.0 08/02/2018 08:23 AM     Lab Results   Component Value Date/Time    Cholesterol, total 211 (H) 08/02/2018 08:23 AM    HDL Cholesterol 63 (H) 08/02/2018 08:23 AM    LDL, calculated 119.2 (H) 08/02/2018 08:23 AM    VLDL, calculated 28.8 08/02/2018 08:23 AM    Triglyceride 144 08/02/2018 08:23 AM    CHOL/HDL Ratio 3.3 08/02/2018 08:23 AM     10 yr CVD risk 1.9%. Lab work reviewed with the patient. ASSESSMENT:     1. Acquired hypothyroidism    2. Low serum calcium    3. Bee sting allergy        PLAN:     Orders Placed This Encounter    VITAMIN D, 25 HYDROXY    CALCIUM, IONIZED    PHOSPHORUS    TSH 3RD GENERATION    T4, FREE    EPINEPHrine (EPIPEN) 0.3 mg/0.3 mL injection       Pharmacologic Management: Medications reviewed with the patient.   Will adjust synthroid after rechecking in 2-3 months. Other Instructions: Discussed DDx, follow-up & work-up. Discussed risk/benefit & side effect of treatment. Follow up visit as planned, prn sooner. Low salt ADA diet, weightloss & graduated excecise. Health risk from non adherence discussed. Patient voiced understanding. Follow-up Disposition:  Return in about 6 months (around 2/6/2019). Declines sooner appointment.     Vini Medrano MD

## 2018-08-07 LAB — CA-I SERPL ISE-MCNC: 5.1 MG/DL (ref 4.5–5.6)

## 2018-08-08 RX ORDER — LEVOTHYROXINE SODIUM 75 UG/1
TABLET ORAL
Qty: 90 TAB | Refills: 0 | Status: SHIPPED | OUTPATIENT
Start: 2018-08-08 | End: 2018-11-06 | Stop reason: SDUPTHER

## 2018-10-09 ENCOUNTER — TELEPHONE (OUTPATIENT)
Dept: FAMILY MEDICINE CLINIC | Age: 48
End: 2018-10-09

## 2018-10-09 NOTE — TELEPHONE ENCOUNTER
----- Message from Gomez Kilpatrick. Hasl sent at 10/7/2018  4:57 PM EDT -----  Regarding: Non-Urgent Medical Question  Contact: [phone]  Hi,  I have a spot on my chest that seems to be growing. I took a picture of it, see attached. It's about 3/8\" long. I would like to have it checked by a dermatologist.   I also have a small 'dry' spot on my temple that the Dr looked at last time I was in, he said could be 'frozen' but it was not worth it for just one spot. But if I go and have the large spot checked they could do it too. Can you recommend a dermatologist? or do you recommend something else? Thank you!   Saba Garcias

## 2018-10-22 DIAGNOSIS — L98.9 SKIN LESIONS: Primary | ICD-10-CM

## 2018-11-06 ENCOUNTER — DOCUMENTATION ONLY (OUTPATIENT)
Dept: FAMILY MEDICINE CLINIC | Age: 48
End: 2018-11-06

## 2018-11-06 RX ORDER — LEVOTHYROXINE SODIUM 75 UG/1
TABLET ORAL
Qty: 90 TAB | Refills: 0 | Status: SHIPPED | OUTPATIENT
Start: 2018-11-06 | End: 2018-12-28 | Stop reason: SDUPTHER

## 2018-12-28 ENCOUNTER — HOSPITAL ENCOUNTER (OUTPATIENT)
Dept: LAB | Age: 48
Discharge: HOME | End: 2018-12-28
Payer: COMMERCIAL

## 2018-12-28 DIAGNOSIS — E03.9 ACQUIRED HYPOTHYROIDISM: ICD-10-CM

## 2018-12-28 LAB
T4 FREE SERPL-MCNC: 1 NG/DL (ref 0.7–1.5)
TSH SERPL DL<=0.05 MIU/L-ACNC: 7.6 UIU/ML (ref 0.36–3.74)

## 2018-12-28 PROCEDURE — 36415 COLL VENOUS BLD VENIPUNCTURE: CPT

## 2018-12-28 PROCEDURE — 84443 ASSAY THYROID STIM HORMONE: CPT

## 2018-12-28 PROCEDURE — 84439 ASSAY OF FREE THYROXINE: CPT

## 2018-12-28 RX ORDER — LEVOTHYROXINE SODIUM 88 UG/1
88 TABLET ORAL
Qty: 90 TAB | Refills: 1 | Status: SHIPPED | OUTPATIENT
Start: 2018-12-28 | End: 2019-06-03 | Stop reason: SDUPTHER

## 2018-12-28 NOTE — PROGRESS NOTES
Thyroid profile is unchanged. Increase levothyroxine from 75 to 88 mcg daily. We need to recheck thyroid profile in about 3 months. New prescription sent to pharmacy.

## 2019-02-07 ENCOUNTER — TELEPHONE (OUTPATIENT)
Dept: FAMILY MEDICINE CLINIC | Age: 49
End: 2019-02-07

## 2019-02-07 NOTE — TELEPHONE ENCOUNTER
----- Message from Brenda Lees MD sent at 12/28/2018  6:59 PM EST -----  Thyroid profile is unchanged. Increase levothyroxine from 75 to 88 mcg daily. We need to recheck thyroid profile in about 3 months. New prescription sent to pharmacy.

## 2019-02-07 NOTE — TELEPHONE ENCOUNTER
----- Message from Nick Rivera MD sent at 1/20/2019  2:27 PM EST -----  Pleae contact the patient with my note on 12/28/18.

## 2019-02-07 NOTE — TELEPHONE ENCOUNTER
Notes recorded by Darrell Farooq MD on 12/28/2018 at 6:59 PM EST  Thyroid profile is unchanged.  Increase levothyroxine from 75 to 88 mcg daily.  We need to recheck thyroid profile in about 3 months.  New prescription sent to pharmacy. Spoke to pt and made aware of the message, verbalized understanding.  Thank you  Jono Lambert LPN

## 2019-07-02 ENCOUNTER — HOSPITAL ENCOUNTER (OUTPATIENT)
Dept: LAB | Age: 49
Discharge: HOME | End: 2019-07-02
Payer: COMMERCIAL

## 2019-07-02 ENCOUNTER — OFFICE VISIT (OUTPATIENT)
Dept: FAMILY MEDICINE CLINIC | Age: 49
End: 2019-07-02

## 2019-07-02 VITALS
DIASTOLIC BLOOD PRESSURE: 78 MMHG | TEMPERATURE: 96.3 F | RESPIRATION RATE: 17 BRPM | WEIGHT: 186.6 LBS | SYSTOLIC BLOOD PRESSURE: 118 MMHG | HEIGHT: 71 IN | HEART RATE: 46 BPM | OXYGEN SATURATION: 97 % | BODY MASS INDEX: 26.12 KG/M2

## 2019-07-02 DIAGNOSIS — E03.9 ACQUIRED HYPOTHYROIDISM: ICD-10-CM

## 2019-07-02 DIAGNOSIS — R79.89 LOW SERUM CALCIUM: ICD-10-CM

## 2019-07-02 DIAGNOSIS — E55.9 HYPOVITAMINOSIS D: ICD-10-CM

## 2019-07-02 DIAGNOSIS — Z00.00 ROUTINE GENERAL MEDICAL EXAMINATION AT A HEALTH CARE FACILITY: ICD-10-CM

## 2019-07-02 DIAGNOSIS — Z00.00 ROUTINE GENERAL MEDICAL EXAMINATION AT A HEALTH CARE FACILITY: Primary | ICD-10-CM

## 2019-07-02 LAB
ALBUMIN SERPL-MCNC: 4.2 G/DL (ref 3.4–5)
ALBUMIN/GLOB SERPL: 1.5 {RATIO} (ref 0.8–1.7)
ALP SERPL-CCNC: 50 U/L (ref 45–117)
ALT SERPL-CCNC: 28 U/L (ref 16–61)
ANION GAP SERPL CALC-SCNC: 6 MMOL/L (ref 3–18)
APPEARANCE UR: CLEAR
AST SERPL-CCNC: 31 U/L (ref 15–37)
BASOPHILS # BLD: 0 K/UL (ref 0–0.1)
BASOPHILS NFR BLD: 0 % (ref 0–2)
BILIRUB SERPL-MCNC: 1.2 MG/DL (ref 0.2–1)
BILIRUB UR QL: NEGATIVE
BUN SERPL-MCNC: 11 MG/DL (ref 7–18)
BUN/CREAT SERPL: 13 (ref 12–20)
CALCIUM SERPL-MCNC: 9 MG/DL (ref 8.5–10.1)
CHLORIDE SERPL-SCNC: 107 MMOL/L (ref 100–108)
CHOLEST SERPL-MCNC: 206 MG/DL
CO2 SERPL-SCNC: 28 MMOL/L (ref 21–32)
COLOR UR: NORMAL
CREAT SERPL-MCNC: 0.85 MG/DL (ref 0.6–1.3)
DIFFERENTIAL METHOD BLD: ABNORMAL
EOSINOPHIL # BLD: 0.1 K/UL (ref 0–0.4)
EOSINOPHIL NFR BLD: 3 % (ref 0–5)
ERYTHROCYTE [DISTWIDTH] IN BLOOD BY AUTOMATED COUNT: 13 % (ref 11.6–14.5)
GLOBULIN SER CALC-MCNC: 2.8 G/DL (ref 2–4)
GLUCOSE SERPL-MCNC: 77 MG/DL (ref 74–99)
GLUCOSE UR STRIP.AUTO-MCNC: NEGATIVE MG/DL
HCT VFR BLD AUTO: 43.4 % (ref 36–48)
HDLC SERPL-MCNC: 71 MG/DL (ref 40–60)
HDLC SERPL: 2.9 {RATIO} (ref 0–5)
HGB BLD-MCNC: 15 G/DL (ref 13–16)
HGB UR QL STRIP: NEGATIVE
KETONES UR QL STRIP.AUTO: NEGATIVE MG/DL
LDLC SERPL CALC-MCNC: 112.2 MG/DL (ref 0–100)
LEUKOCYTE ESTERASE UR QL STRIP.AUTO: NEGATIVE
LIPID PROFILE,FLP: ABNORMAL
LYMPHOCYTES # BLD: 1.1 K/UL (ref 0.9–3.6)
LYMPHOCYTES NFR BLD: 31 % (ref 21–52)
MCH RBC QN AUTO: 31.7 PG (ref 24–34)
MCHC RBC AUTO-ENTMCNC: 34.6 G/DL (ref 31–37)
MCV RBC AUTO: 91.8 FL (ref 74–97)
MONOCYTES # BLD: 0.2 K/UL (ref 0.05–1.2)
MONOCYTES NFR BLD: 5 % (ref 3–10)
NEUTS SEG # BLD: 2.2 K/UL (ref 1.8–8)
NEUTS SEG NFR BLD: 61 % (ref 40–73)
NITRITE UR QL STRIP.AUTO: NEGATIVE
PH UR STRIP: 7.5 [PH] (ref 5–8)
PLATELET # BLD AUTO: 180 K/UL (ref 135–420)
PMV BLD AUTO: 12.1 FL (ref 9.2–11.8)
POTASSIUM SERPL-SCNC: 4.6 MMOL/L (ref 3.5–5.5)
PROT SERPL-MCNC: 7 G/DL (ref 6.4–8.2)
PROT UR STRIP-MCNC: NEGATIVE MG/DL
RBC # BLD AUTO: 4.73 M/UL (ref 4.7–5.5)
SODIUM SERPL-SCNC: 141 MMOL/L (ref 136–145)
SP GR UR REFRACTOMETRY: 1.02 (ref 1–1.03)
T4 FREE SERPL-MCNC: 1 NG/DL (ref 0.7–1.5)
TRIGL SERPL-MCNC: 114 MG/DL (ref ?–150)
TSH SERPL DL<=0.05 MIU/L-ACNC: 4.4 UIU/ML (ref 0.36–3.74)
UROBILINOGEN UR QL STRIP.AUTO: 1 EU/DL (ref 0.2–1)
VLDLC SERPL CALC-MCNC: 22.8 MG/DL
WBC # BLD AUTO: 3.7 K/UL (ref 4.6–13.2)

## 2019-07-02 PROCEDURE — 81003 URINALYSIS AUTO W/O SCOPE: CPT

## 2019-07-02 PROCEDURE — 84443 ASSAY THYROID STIM HORMONE: CPT

## 2019-07-02 PROCEDURE — 80053 COMPREHEN METABOLIC PANEL: CPT

## 2019-07-02 PROCEDURE — 80061 LIPID PANEL: CPT

## 2019-07-02 PROCEDURE — 82330 ASSAY OF CALCIUM: CPT

## 2019-07-02 PROCEDURE — 84439 ASSAY OF FREE THYROXINE: CPT

## 2019-07-02 PROCEDURE — 36415 COLL VENOUS BLD VENIPUNCTURE: CPT

## 2019-07-02 PROCEDURE — 85025 COMPLETE CBC W/AUTO DIFF WBC: CPT

## 2019-07-02 PROCEDURE — 82306 VITAMIN D 25 HYDROXY: CPT

## 2019-07-02 NOTE — PROGRESS NOTES
SUBJECTIVE:  Ashley Allen is a 53y.o. year old male   Chief Complaint   Patient presents with    Physical       History of Present Illness:   Patient is here for preventive evaluation. He is following diet and exercise. He is following diet for lipids. His chronic tinnitus is stable. He is following up with dermatology regularly for his skin lesions. He is also following up with ophthalmology. Normal Sensory, Motor, Social and  Language function. No systemic, respiratory or cardiovascular symptoms. Past Medical History:   Diagnosis Date    Elevated TSH     Hayfever     during spring    Hyperlipidemia     Multiple lipomas      Past Surgical History:   Procedure Laterality Date    HX PREMALIG/BENIGN SKIN LESION EXCISION Right 10/2016    LR of back    HX VASECTOMY  06/02/2017    [facility], [facility]     HX VASECTOMY N/A 2017        Current Outpatient Medications   Medication Sig    levothyroxine (SYNTHROID) 88 mcg tablet TAKE 1 TABLET DAILY BEFORE BREAKFAST    multivitamin (ONE A DAY) tablet Take 1 Tab by mouth daily. No current facility-administered medications for this visit. Allergies   Allergen Reactions    Bee Sting [Sting, Bee] Anaphylaxis    Banana Nausea and Vomiting    Egg Nausea and Vomiting    Milk Nausea Only    Peanut Other (comments)        Family History   Problem Relation Age of Onset    Heart Disease Mother     Heart Disease Father     Hypertension Father     Hypertension Brother         Social History     Tobacco Use    Smoking status: Former Smoker     Packs/day: 0.50     Years: 8.00     Pack years: 4.00     Last attempt to quit: 1/14/2009     Years since quitting: 10.4    Smokeless tobacco: Never Used   Substance Use Topics    Alcohol use: Yes     Alcohol/week: 1.8 oz     Types: 3 Standard drinks or equivalent per week       Review of Systems:   Constitutional: No fever, chills, night sweats, malaise, dizziness. Eyes: Glasses.  No eye discomfort, discharge, redness, new visual changes. Ear/Nose/Throat: No ear/ throat/ sinus pain, lesions, unusual discharge, new speaking or hearing problems, epistaxis. Cardiovascular: No angina, palpitations, PND, orthopnea, lightheadedness, edema, claudication. Respiratory: No dyspnea, wheeze, pleurisy, hemoptysis, unusual cough or sputum. Gastrointestinal: No nausea/ vomiting, bowel habit change, pain, YOLANDA symptoms, melena, hematochezia, anorexia. Genitourinary: Denies any comlaints  Musculoskeletal: No joint swelling/pain, instability, focal weakness, stiffness/rigidity, radicular pain. Skin/Breast: Denies any complaints. Neurological: No seizures, numbness, dizziness, speech abnormality, incontinence. Psychiatric: No agitation, confusion/disorientation, suicidal or homicidal ideation. Endocrine: Denies any complaints. Heme/Onc/Lympha: Denies any complaints. Allergy/Immunol: Denies any complaints. OBJECTIVE:  Physical Exam:   Constitutional: General Appearance:  well developed, well nourished, nontoxic, in no acute distress. Visit Vitals  /78 (BP 1 Location: Left arm, BP Patient Position: Sitting)   Pulse (!) 46   Temp 96.3 °F (35.7 °C) (Oral)   Resp 17   Ht 5' 11\" (1.803 m)   Wt 186 lb 9.6 oz (84.6 kg)   SpO2 97%   BMI 26.03 kg/m²     Eyes: Conjunctiva: normal & Lids: normal.  Pupils & Irises: normal size; equal, round and reactive to light. ENT/Mouth: Otoscopic Examination: ear canals are clear; tympanic membranes are clear. Nose: clear. Throat:  Clear. Neck Area: Neck: without masses, symmetric, trachea is midline. Thyroid:  without significant enlargement, masses or tenderness. Pulmonary: Respiratory effort: normal; no dyspnea, no retractions, no accessory muscle use. Auscultation: normal & symmetrical air exchange; no rales, no rhonchi, no wheeze; no rubs. Cardiovascular: Palpation: PMI not displaced or enlarged, no thrills or heaves.    Auscultation: RRR; no murmur, rubs or gallops. Extremities: no edema, no active varicosity. Neck: carotid arteries- normal volume & without bruit; no JVD. Femoral arteries: normal volume, no bruit. Pedal pulses: normal volume. Gastrointestinal: Normal bowel sounds. No masses; no tenderness; no rebound/rigidity; no CVA tenderness. No hepatosplenomegaly. No inguinal, ventral or umbilical hernias. Genitourinary: patient declined. Skin: Inspection: Few seborrheic keratosis; no other significant rashes, lesions or ulcers. Nodes: Cervical: no significant adenopathy. Inguinal: no significant adenopathy. Psychiatric: Oriented to time, place and person. Normal mood, no agitation or anxiety. Normal affect. Pleasant and cooperative. Neurologic: CN I to XII: intact. DTR: symmetrical & normal.    Musculoskeletal:   NC/AT. Neck-supple. Gait and Station: gait- normal; station- normal.    All Extremities: no heat, effusion, redness or tenderness; normal strength/tone; no instability; FROM. Spine/ back/ posture: normal.    Lab Results   Component Value Date/Time    WBC 3.6 (L) 08/02/2018 08:23 AM    HGB 14.4 08/02/2018 08:23 AM    HCT 41.2 08/02/2018 08:23 AM    PLATELET 774 62/16/4684 08:23 AM    MCV 90.9 08/02/2018 08:23 AM     Lab Results   Component Value Date/Time    Sodium 139 08/02/2018 08:23 AM    Potassium 4.2 08/02/2018 08:23 AM    Chloride 106 08/02/2018 08:23 AM    CO2 28 08/02/2018 08:23 AM    Anion gap 5 08/02/2018 08:23 AM    Glucose 85 08/02/2018 08:23 AM    BUN 9 08/02/2018 08:23 AM    Creatinine 0.71 08/02/2018 08:23 AM    BUN/Creatinine ratio 13 08/02/2018 08:23 AM    GFR est AA >60 08/02/2018 08:23 AM    GFR est non-AA >60 08/02/2018 08:23 AM    Calcium 8.3 (L) 08/02/2018 08:23 AM    Bilirubin, total 0.6 08/02/2018 08:23 AM    AST (SGOT) 23 08/02/2018 08:23 AM    Alk.  phosphatase 50 08/02/2018 08:23 AM    Protein, total 6.8 08/02/2018 08:23 AM    Albumin 3.9 08/02/2018 08:23 AM    Globulin 2.9 08/02/2018 08:23 AM    A-G Ratio 1.3 08/02/2018 08:23 AM    ALT (SGPT) 25 08/02/2018 08:23 AM     Lab Results   Component Value Date/Time    TSH 7.60 (H) 12/28/2018 09:34 AM    Triiodothyronine (T3), free 2.9 01/21/2014 12:02 PM    T4, Free 1.0 12/28/2018 09:34 AM     Lab Results   Component Value Date/Time    Cholesterol, total 211 (H) 08/02/2018 08:23 AM    HDL Cholesterol 63 (H) 08/02/2018 08:23 AM    LDL, calculated 119.2 (H) 08/02/2018 08:23 AM    VLDL, calculated 28.8 08/02/2018 08:23 AM    Triglyceride 144 08/02/2018 08:23 AM    CHOL/HDL Ratio 3.3 08/02/2018 08:23 AM           ASSESSMENT:     1. Routine general medical examination at a health care facility    2. Acquired hypothyroidism    3. Low serum calcium    4. Hypovitaminosis D        PLAN:     Orders Placed This Encounter    CBC WITH AUTOMATED DIFF    METABOLIC PANEL, COMPREHENSIVE    LIPID PANEL    TSH 3RD GENERATION    T4, FREE    URINALYSIS W/ RFLX MICROSCOPIC    VITAMIN D, 25 HYDROXY    CALCIUM, IONIZED        Pharmacologic Management: Medications reviewed with the patient. No change from here. Other Instructions: Discussed DDx, follow-up & work-up. Discussed risk/benefit & side effect of treatment. Follow up visit as planned, prn sooner. Discussed nutrition, education, accident prevention and anticipatory guidance. Health risk from non adherence discussed. Patient voiced understanding. Follow-up and Dispositions    · Return in about 3 months (around 10/2/2019).          Jesus Red MD

## 2019-07-02 NOTE — PROGRESS NOTES
Deana Howell is a 53 y.o. male presents in office for cpe. Health Maintenance Due   Topic Date Due    DTaP/Tdap/Td series (1 - Tdap) 07/09/1992       1. Have you been to the ER, urgent care clinic since your last visit? Hospitalized since your last visit? No    2. Have you seen or consulted any other health care providers outside of the [address] [place] [address] since your last visit? Include any pap smears or colon screening.  No

## 2019-07-03 LAB
25(OH)D3 SERPL-MCNC: 37.4 NG/ML (ref 30–100)
CA-I SERPL ISE-MCNC: 5 MG/DL (ref 4.5–5.6)

## 2019-07-04 NOTE — PROGRESS NOTES
Vitamin D level is now in target. Complete metabolic panel showed normal calcium. Bilirubin was borderline high; but same as 2 years ago-likely normal for patient. Lipid panel showed mildly elevated LDL again. T4 was same and TSH is still a little high. Increase levothyroxine (Synthroid) to 100 mcg daily (from 88 mcg). Check thyroid panel again in 3-4 months.

## 2019-07-09 ENCOUNTER — TELEPHONE (OUTPATIENT)
Dept: FAMILY MEDICINE CLINIC | Age: 49
End: 2019-07-09

## 2019-07-09 NOTE — TELEPHONE ENCOUNTER
----- Message from Valentin Morgan MD sent at 7/4/2019  2:34 PM EDT -----  Vitamin D level is now in target. Complete metabolic panel showed normal calcium. Bilirubin was borderline high; but same as 2 years ago-likely normal for patient. Lipid panel showed mildly elevated LDL again. T4 was same and TSH is still a little high. Increase levothyroxine (Synthroid) to 100 mcg daily (from 88 mcg). Check thyroid panel again in 3-4 months.

## 2019-07-10 RX ORDER — LEVOTHYROXINE SODIUM 100 UG/1
100 TABLET ORAL
Qty: 90 TAB | Refills: 3 | Status: SHIPPED | OUTPATIENT
Start: 2019-07-10 | End: 2019-11-08 | Stop reason: DRUGHIGH

## 2019-07-10 NOTE — TELEPHONE ENCOUNTER
Called, verified 2 patient identifiers, and provided lab results and recommendations to patient. Patient verbalized understanding and had no further questions or concerns. Please sign rx for 100 mcg levothyroxine.

## 2019-11-04 ENCOUNTER — TELEPHONE (OUTPATIENT)
Dept: FAMILY MEDICINE CLINIC | Age: 49
End: 2019-11-04

## 2019-11-04 DIAGNOSIS — E03.9 ACQUIRED HYPOTHYROIDISM: Primary | ICD-10-CM

## 2019-11-04 NOTE — PROGRESS NOTES
Thyroid profile ordered.   Please notify patient to come to the lab for the test.  The patient wrote: \"Please call me and leave a message when you have done it so I will know I can come in. [phone]\"

## 2019-11-04 NOTE — TELEPHONE ENCOUNTER
----- Message from Cyndy Baig sent at 11/1/2019  3:02 PM EDT -----  Regarding: Visit Follow-Up Question  Contact: [phone]  Hello,  I need to come in and do the thyroid blood test.    Please set up the test with your blood lab so I can come in and do it.     Please call me and leave a message when you have done it so I will know I can come in. [phone]  Thank you,  Evgeny Tate

## 2019-11-06 ENCOUNTER — HOSPITAL ENCOUNTER (OUTPATIENT)
Dept: LAB | Age: 49
Discharge: HOME | End: 2019-11-06
Payer: COMMERCIAL

## 2019-11-06 DIAGNOSIS — E03.9 ACQUIRED HYPOTHYROIDISM: ICD-10-CM

## 2019-11-06 LAB
T4 FREE SERPL-MCNC: 1.1 NG/DL (ref 0.7–1.5)
TSH SERPL DL<=0.05 MIU/L-ACNC: 4.21 UIU/ML (ref 0.36–3.74)

## 2019-11-06 PROCEDURE — 84439 ASSAY OF FREE THYROXINE: CPT

## 2019-11-06 PROCEDURE — 84443 ASSAY THYROID STIM HORMONE: CPT

## 2019-11-06 PROCEDURE — 36415 COLL VENOUS BLD VENIPUNCTURE: CPT

## 2019-11-08 ENCOUNTER — TELEPHONE (OUTPATIENT)
Dept: FAMILY MEDICINE CLINIC | Age: 49
End: 2019-11-08

## 2019-11-08 RX ORDER — LEVOTHYROXINE SODIUM 112 UG/1
112 TABLET ORAL
Qty: 100 TAB | Refills: 1 | Status: SHIPPED | OUTPATIENT
Start: 2019-11-08 | End: 2020-04-18

## 2019-11-08 NOTE — PROGRESS NOTES
T4 level is good. TSH is improving; but still borderline high, indicating borderline hypothyroid. The patient is taking Synthroid 100 mcg now; we will increase 112 mcg daily and recheck in 3 months. New Synthroid order sent to [facility].

## 2019-11-08 NOTE — TELEPHONE ENCOUNTER
----- Message from Bety Camacho MD sent at 11/8/2019 12:47 PM EST -----  T4 level is good. TSH is improving; but still borderline high, indicating borderline hypothyroid. The patient is taking Synthroid 100 mcg now; we will increase 112 mcg daily and recheck in 3 months. New Synthroid order sent to [facility].

## 2020-04-18 RX ORDER — LEVOTHYROXINE SODIUM 112 UG/1
TABLET ORAL
Qty: 30 TAB | Refills: 0 | Status: SHIPPED | OUTPATIENT
Start: 2020-04-18 | End: 2020-06-15 | Stop reason: SDUPTHER

## 2020-05-18 ENCOUNTER — VIRTUAL VISIT (OUTPATIENT)
Dept: FAMILY MEDICINE CLINIC | Facility: CLINIC | Age: 50
End: 2020-05-18

## 2020-05-18 DIAGNOSIS — E78.00 ELEVATED LDL CHOLESTEROL LEVEL: ICD-10-CM

## 2020-05-18 DIAGNOSIS — E55.9 HYPOVITAMINOSIS D: ICD-10-CM

## 2020-05-18 DIAGNOSIS — E03.9 ACQUIRED HYPOTHYROIDISM: Primary | ICD-10-CM

## 2020-05-18 RX ORDER — EPINEPHRINE 0.3 MG/.3ML
0.3 INJECTION SUBCUTANEOUS
COMMUNITY

## 2020-05-18 NOTE — PROGRESS NOTES
Consent:   Uri Bianchi, who was seen by synchronous (real-time) audio-video technology, and/or his healthcare decision maker, is aware that this patient-initiated, Telehealth encounter on 5/18/2020 is a billable service, with coverage as determined by his insurance carrier. He is aware that he may receive a bill and has provided verbal consent to proceed: yes. .  SUBJECTIVE:  Uri Bianchi is a 51y.o. year old male   Chief Complaint   Patient presents with    Hypothyroidism    Cholesterol Problem       History of Present Illness:   He is on Synthroid for hypothyroidism. His levothyroxine was increased several months ago because of high TSH. His Thyroid profile is improving. He is doing cardiovascular exercises and denies any hemodynamic symptoms. BP (116/76) at home is running good when he checks it. [place] is stable @ 50 BPM.  He is following diet for lipids and weight. He needs Epipen refill for his bee sting allergy. No systemic, respiratory or cardiovascular symptoms. Past Medical History:   Diagnosis Date    Elevated TSH     Hayfever     during spring    Hyperlipidemia     Multiple lipomas      Past Surgical History:   Procedure Laterality Date    HX PREMALIG/BENIGN SKIN LESION EXCISION Right 10/2016    LR of back    HX VASECTOMY  06/02/2017    [facility], [facility]     HX VASECTOMY N/A 2017        Current Outpatient Medications   Medication Sig    levothyroxine (SYNTHROID) 112 mcg tablet TAKE 1 TABLET DAILY BEFORE BREAKFAST    multivitamin (ONE A DAY) tablet Take 1 Tab by mouth daily. No current facility-administered medications for this visit. Allergies   Allergen Reactions    Bee Sting [Sting, Bee] Anaphylaxis    Egg Nausea and Vomiting    Milk Nausea Only    Peanut Other (comments)       Review of Systems:   Constitutional: No fever, chills, night sweats, malaise, dizziness.    Cardiovascular: No angina, palpitations, PND, orthopnea, lightheadedness, edema, claudication. Respiratory: No dyspnea, wheeze, pleurisy, hemoptysis, unusual cough or sputum. Gastrointestinal: No nausea/ vomiting, bowel habit change, pain, YOLANDA symptoms, melena, hematochezia, anorexia. Musculoskeletal: No joint swelling/pain, instability, focal weakness, stiffness/rigidity, radicular pain. Neurological: No seizures, numbness, dizziness, speech abnormality, incontinence. Psychiatric: No agitation, confusion/disorientation, suicidal or homicidal ideation. Endocrine: Hypothyroidism as above. Denies any other complaints. OBJECTIVE:  Physical Exam:   Constitutional: General Appearance:  Appears well-developed and well nourished. Nontoxic, in no acute distress. Mental status:  Alert and awake. Oriented to person/place/time. Able to follow commands. Eyes:   Sclera  Normal.  HENT:  Normocephalic, atraumatic. No Facial Asymmetry. No gaze palsy    Pulmonary: Respiratory effort: normal; no dyspnea. Neurological:   Speech normal.      Psychiatric:  Pleasant and cooperative. Normal Affect. No Hallucinations. Musculoskeletal[de-identified] neck is supple. Lab Results   Component Value Date/Time    WBC 3.7 (L) 07/02/2019 09:24 AM    HGB 15.0 07/02/2019 09:24 AM    HCT 43.4 07/02/2019 09:24 AM    PLATELET 144 32/75/0969 09:24 AM    MCV 91.8 07/02/2019 09:24 AM     Lab Results   Component Value Date/Time    Sodium 141 07/02/2019 09:24 AM    Potassium 4.6 07/02/2019 09:24 AM    Chloride 107 07/02/2019 09:24 AM    CO2 28 07/02/2019 09:24 AM    Anion gap 6 07/02/2019 09:24 AM    Glucose 77 07/02/2019 09:24 AM    BUN 11 07/02/2019 09:24 AM    Creatinine 0.85 07/02/2019 09:24 AM    BUN/Creatinine ratio 13 07/02/2019 09:24 AM    GFR est AA >60 07/02/2019 09:24 AM    GFR est non-AA >60 07/02/2019 09:24 AM    Calcium 9.0 07/02/2019 09:24 AM    Bilirubin, total 1.2 (H) 07/02/2019 09:24 AM    AST (SGOT) 31 07/02/2019 09:24 AM    Alk.  phosphatase 50 07/02/2019 09:24 AM    Protein, total 7.0 07/02/2019 09:24 AM    Albumin 4.2 07/02/2019 09:24 AM    Globulin 2.8 07/02/2019 09:24 AM    A-G Ratio 1.5 07/02/2019 09:24 AM    ALT (SGPT) 28 07/02/2019 09:24 AM     Lab Results   Component Value Date/Time    TSH 4.21 (H) 11/06/2019 08:13 AM    Triiodothyronine (T3), free 2.9 01/21/2014 12:02 PM    T4, Free 1.1 11/06/2019 08:13 AM     Lab Results   Component Value Date/Time    Cholesterol, total 206 (H) 07/02/2019 09:24 AM    HDL Cholesterol 71 (H) 07/02/2019 09:24 AM    LDL, calculated 112.2 (H) 07/02/2019 09:24 AM    VLDL, calculated 22.8 07/02/2019 09:24 AM    Triglyceride 114 07/02/2019 09:24 AM    CHOL/HDL Ratio 2.9 07/02/2019 09:24 AM     10 yr CVD risk << 7.5%    Lab work reviewed with the patient. ASSESSMENT:     1. Acquired hypothyroidism    2. Hypovitaminosis D    3. Elevated LDL cholesterol level        PLAN:     Orders Placed This Encounter    CBC WITH AUTOMATED DIFF    METABOLIC PANEL, COMPREHENSIVE    TSH 3RD GENERATION    T4, FREE    URINALYSIS W/ RFLX MICROSCOPIC    VITAMIN D, 25 HYDROXY   Patient will come to the lab fasting. Pharmacologic Management: Medications reviewed with the patient. Will adjust synthroid after rechecking labs today. Discussed DDx, follow-up & work-up. Discussed risk/benefit & side effect of treatment. Follow up visit as planned, prn sooner. Low salt ADA diet, weightloss & graduated excecise. Health risk from non adherence discussed. Patient voiced understanding. .    Follow-up and Dispositions    · Return in about 6 months (around 11/18/2020). Declines sooner appointment    Catrachito Moncada is a 51 y.o. male who was evaluated by a video visit encounter for concerns as above. A caregiver was present when appropriate.  Due to this being a TeleHealth encounter (During [place] [address] public health emergency), evaluation of the following organ systems was limited: Vitals/Constitutional/EENT/Resp/CV/GI//MS/Neuro/Skin/Heme-Lymph-Imm. Pursuant to the emergency declaration under the [address] [place] [address], [facility] waiver authority and the [employer] Act, this Virtual  Visit was conducted, with patient's (and/or legal guardian's) consent, to reduce the patient's risk of exposure to COVID-19 and provide necessary medical care. Services were provided through a video synchronous discussion virtually to substitute for in-person clinic visit. Patient and provider were located at their individual homes.       Chanel Mckeon MD

## 2020-06-10 ENCOUNTER — HOSPITAL ENCOUNTER (OUTPATIENT)
Dept: LAB | Age: 50
Discharge: HOME | End: 2020-06-10
Payer: COMMERCIAL

## 2020-06-10 DIAGNOSIS — E78.00 ELEVATED LDL CHOLESTEROL LEVEL: ICD-10-CM

## 2020-06-10 DIAGNOSIS — E55.9 HYPOVITAMINOSIS D: ICD-10-CM

## 2020-06-10 DIAGNOSIS — E03.9 ACQUIRED HYPOTHYROIDISM: ICD-10-CM

## 2020-06-10 LAB
25(OH)D3 SERPL-MCNC: 31.7 NG/ML (ref 30–100)
ALBUMIN SERPL-MCNC: 4.2 G/DL (ref 3.4–5)
ALBUMIN/GLOB SERPL: 1.6 {RATIO} (ref 0.8–1.7)
ALP SERPL-CCNC: 50 U/L (ref 45–117)
ALT SERPL-CCNC: 26 U/L (ref 16–61)
ANION GAP SERPL CALC-SCNC: 6 MMOL/L (ref 3–18)
APPEARANCE UR: CLEAR
AST SERPL-CCNC: 26 U/L (ref 10–38)
BASOPHILS # BLD: 0 K/UL (ref 0–0.1)
BASOPHILS NFR BLD: 0 % (ref 0–2)
BILIRUB SERPL-MCNC: 1.5 MG/DL (ref 0.2–1)
BILIRUB UR QL: NEGATIVE
BUN SERPL-MCNC: 10 MG/DL (ref 7–18)
BUN/CREAT SERPL: 13 (ref 12–20)
CALCIUM SERPL-MCNC: 9 MG/DL (ref 8.5–10.1)
CHLORIDE SERPL-SCNC: 108 MMOL/L (ref 100–111)
CO2 SERPL-SCNC: 27 MMOL/L (ref 21–32)
COLOR UR: YELLOW
CREAT SERPL-MCNC: 0.75 MG/DL (ref 0.6–1.3)
DIFFERENTIAL METHOD BLD: ABNORMAL
EOSINOPHIL # BLD: 0.1 K/UL (ref 0–0.4)
EOSINOPHIL NFR BLD: 2 % (ref 0–5)
ERYTHROCYTE [DISTWIDTH] IN BLOOD BY AUTOMATED COUNT: 13.2 % (ref 11.6–14.5)
GLOBULIN SER CALC-MCNC: 2.6 G/DL (ref 2–4)
GLUCOSE SERPL-MCNC: 90 MG/DL (ref 74–99)
GLUCOSE UR STRIP.AUTO-MCNC: NEGATIVE MG/DL
HCT VFR BLD AUTO: 41.3 % (ref 36–48)
HGB BLD-MCNC: 14.4 G/DL (ref 13–16)
HGB UR QL STRIP: NEGATIVE
KETONES UR QL STRIP.AUTO: NEGATIVE MG/DL
LEUKOCYTE ESTERASE UR QL STRIP.AUTO: NEGATIVE
LYMPHOCYTES # BLD: 1 K/UL (ref 0.9–3.6)
LYMPHOCYTES NFR BLD: 37 % (ref 21–52)
MCH RBC QN AUTO: 31.6 PG (ref 24–34)
MCHC RBC AUTO-ENTMCNC: 34.9 G/DL (ref 31–37)
MCV RBC AUTO: 90.6 FL (ref 74–97)
MONOCYTES # BLD: 0.2 K/UL (ref 0.05–1.2)
MONOCYTES NFR BLD: 6 % (ref 3–10)
NEUTS SEG # BLD: 1.5 K/UL (ref 1.8–8)
NEUTS SEG NFR BLD: 55 % (ref 40–73)
NITRITE UR QL STRIP.AUTO: NEGATIVE
PH UR STRIP: 8 [PH] (ref 5–8)
PLATELET # BLD AUTO: 192 K/UL (ref 135–420)
PMV BLD AUTO: 12.2 FL (ref 9.2–11.8)
POTASSIUM SERPL-SCNC: 4.2 MMOL/L (ref 3.5–5.5)
PROT SERPL-MCNC: 6.8 G/DL (ref 6.4–8.2)
PROT UR STRIP-MCNC: NEGATIVE MG/DL
RBC # BLD AUTO: 4.56 M/UL (ref 4.7–5.5)
SODIUM SERPL-SCNC: 141 MMOL/L (ref 136–145)
SP GR UR REFRACTOMETRY: 1.01 (ref 1–1.03)
T4 FREE SERPL-MCNC: 1.2 NG/DL (ref 0.7–1.5)
TSH SERPL DL<=0.05 MIU/L-ACNC: 2.97 UIU/ML (ref 0.36–3.74)
UROBILINOGEN UR QL STRIP.AUTO: 0.2 EU/DL (ref 0.2–1)
WBC # BLD AUTO: 2.7 K/UL (ref 4.6–13.2)

## 2020-06-10 PROCEDURE — 84439 ASSAY OF FREE THYROXINE: CPT

## 2020-06-10 PROCEDURE — 85025 COMPLETE CBC W/AUTO DIFF WBC: CPT

## 2020-06-10 PROCEDURE — 81003 URINALYSIS AUTO W/O SCOPE: CPT

## 2020-06-10 PROCEDURE — 80053 COMPREHEN METABOLIC PANEL: CPT

## 2020-06-10 PROCEDURE — 82306 VITAMIN D 25 HYDROXY: CPT

## 2020-06-10 PROCEDURE — 84443 ASSAY THYROID STIM HORMONE: CPT

## 2020-06-11 DIAGNOSIS — D72.819 LEUKOPENIA, UNSPECIFIED TYPE: ICD-10-CM

## 2020-06-11 DIAGNOSIS — R17 TOTAL BILIRUBIN, ELEVATED: Primary | ICD-10-CM

## 2020-06-11 NOTE — PROGRESS NOTES
Blood work shows normal thyroid profile. Vitamin D is in normal range. Urine was clear. Metabolic panel was good except bilirubin was slightly more elevated, which is nonspecific and may be \"normal\" for the patient. White count was lower than in the past.  We will check bilirubin level and white count again in the next 1-2 weeks to confirm stability. Order written.

## 2020-06-15 RX ORDER — LEVOTHYROXINE SODIUM 112 UG/1
TABLET ORAL
Qty: 90 TAB | Refills: 1 | Status: SHIPPED | OUTPATIENT
Start: 2020-06-15 | End: 2020-12-13

## 2020-06-15 NOTE — TELEPHONE ENCOUNTER
Requested Prescriptions     Pending Prescriptions Disp Refills    levothyroxine (SYNTHROID) 112 mcg tablet 30 Tab 0     No future appointments.